# Patient Record
Sex: MALE | Race: WHITE
[De-identification: names, ages, dates, MRNs, and addresses within clinical notes are randomized per-mention and may not be internally consistent; named-entity substitution may affect disease eponyms.]

---

## 2018-07-23 ENCOUNTER — HOSPITAL ENCOUNTER (EMERGENCY)
Age: 33
Discharge: PSYCHIATRIC OR SUBSTANCE USE FACILITY | End: 2018-07-23

## 2018-07-23 ENCOUNTER — HOSPITAL ENCOUNTER (EMERGENCY)
Dept: HOSPITAL 91 - E/R | Age: 33
LOS: 3 days | Discharge: PSYCHIATRIC OR SUBSTANCE USE FACILITY | End: 2018-07-26
Payer: COMMERCIAL

## 2018-07-23 DIAGNOSIS — E11.9: ICD-10-CM

## 2018-07-23 DIAGNOSIS — R44.0: ICD-10-CM

## 2018-07-23 DIAGNOSIS — F79: Primary | ICD-10-CM

## 2018-07-23 DIAGNOSIS — F17.210: ICD-10-CM

## 2018-07-23 LAB
ACETAMINOPHEN: < 10 UG/ML (ref 10–30)
ADD MAN DIFF?: NO
ADD UMIC: YES
ALANINE AMINOTRANSFERASE: 33 IU/L (ref 13–69)
ALBUMIN/GLOBULIN RATIO: 1.05
ALBUMIN: 3.8 G/DL (ref 3.3–4.9)
ALKALINE PHOSPHATASE: 98 IU/L (ref 42–121)
ANION GAP: 14 (ref 8–16)
ASPARTATE AMINO TRANSFERASE: 41 IU/L (ref 15–46)
BASOPHIL #: 0 10^3/UL (ref 0–0.1)
BASOPHILS %: 0.2 % (ref 0–2)
BILIRUBIN,DIRECT: 0 MG/DL (ref 0–0.2)
BILIRUBIN,TOTAL: 0.1 MG/DL (ref 0.2–1.3)
BLOOD UREA NITROGEN: 11 MG/DL (ref 7–20)
CALCIUM: 8.8 MG/DL (ref 8.4–10.2)
CARBON DIOXIDE: 22 MMOL/L (ref 21–31)
CHLORIDE: 108 MMOL/L (ref 97–110)
CREATININE: 0.61 MG/DL (ref 0.61–1.24)
EOSINOPHILS #: 0.2 10^3/UL (ref 0–0.5)
EOSINOPHILS %: 1.3 % (ref 0–7)
ETHANOL: < 10 MG/DL
GLOBULIN: 3.6 G/DL (ref 1.3–3.2)
GLUCOSE: 154 MG/DL (ref 70–220)
HEMATOCRIT: 40.3 % (ref 42–52)
HEMOGLOBIN: 12.8 G/DL (ref 14–18)
LYMPHOCYTES #: 3 10^3/UL (ref 0.8–2.9)
LYMPHOCYTES %: 23.8 % (ref 15–51)
MEAN CORPUSCULAR HEMOGLOBIN: 27.3 PG (ref 29–33)
MEAN CORPUSCULAR HGB CONC: 31.8 G/DL (ref 32–37)
MEAN CORPUSCULAR VOLUME: 85.9 FL (ref 82–101)
MEAN PLATELET VOLUME: 11.3 FL (ref 7.4–10.4)
MONOCYTE #: 0.6 10^3/UL (ref 0.3–0.9)
MONOCYTES %: 4.4 % (ref 0–11)
NEUTROPHIL #: 8.7 10^3/UL (ref 1.6–7.5)
NEUTROPHILS %: 69.4 % (ref 39–77)
NUCLEATED RED BLOOD CELLS #: 0 10^3/UL (ref 0–0)
NUCLEATED RED BLOOD CELLS%: 0 /100WBC (ref 0–0)
PLATELET COUNT: 256 10^3/UL (ref 140–415)
POTASSIUM: 3.7 MMOL/L (ref 3.5–5.1)
RED BLOOD COUNT: 4.69 10^6/UL (ref 4.7–6.1)
RED CELL DISTRIBUTION WIDTH: 15.3 % (ref 11.5–14.5)
SALICYLATE: < 1 MG/DL (ref 5–30)
SODIUM: 140 MMOL/L (ref 135–144)
TOTAL PROTEIN: 7.4 G/DL (ref 6.1–8.1)
UR ASCORBIC ACID: NEGATIVE MG/DL
UR BILIRUBIN (DIP): NEGATIVE MG/DL
UR BLOOD (DIP): NEGATIVE MG/DL
UR CLARITY: CLEAR
UR COLOR: YELLOW
UR GLUCOSE (DIP): NEGATIVE MG/DL
UR KETONES (DIP): NEGATIVE MG/DL
UR LEUKOCYTE ESTERASE (DIP): NEGATIVE LEU/UL
UR MUCUS: (no result) /HPF
UR NITRITE (DIP): NEGATIVE MG/DL
UR PH (DIP): 5 (ref 5–9)
UR RBC: 4 /HPF (ref 0–5)
UR SPECIFIC GRAVITY (DIP): 1.03 (ref 1–1.03)
UR TOTAL PROTEIN (DIP): (no result) MG/DL
UR UROBILINOGEN (DIP): (no result) MG/DL
UR WBC: 1 /HPF (ref 0–5)
WHITE BLOOD COUNT: 12.5 10^3/UL (ref 4.8–10.8)

## 2018-07-23 PROCEDURE — 80053 COMPREHEN METABOLIC PANEL: CPT

## 2018-07-23 PROCEDURE — 85025 COMPLETE CBC W/AUTO DIFF WBC: CPT

## 2018-07-23 PROCEDURE — 81001 URINALYSIS AUTO W/SCOPE: CPT

## 2018-07-23 PROCEDURE — 80307 DRUG TEST PRSMV CHEM ANLYZR: CPT

## 2018-07-23 PROCEDURE — 99285 EMERGENCY DEPT VISIT HI MDM: CPT

## 2018-07-23 RX ADMIN — OLANZAPINE 1 MG: 5 TABLET, FILM COATED ORAL at 23:30

## 2018-07-24 RX ADMIN — OLANZAPINE 1 MG: 5 TABLET, FILM COATED ORAL at 08:20

## 2018-07-25 RX ADMIN — OLANZAPINE 1 MG: 5 TABLET, ORALLY DISINTEGRATING ORAL at 10:00

## 2018-07-25 RX ADMIN — OLANZAPINE 1 MG: 5 TABLET, FILM COATED ORAL at 08:40

## 2018-07-25 RX ADMIN — OLANZAPINE 1 MG: 5 TABLET, FILM COATED ORAL at 21:00

## 2018-07-25 RX ADMIN — DIVALPROEX SODIUM 1 MG: 500 TABLET, DELAYED RELEASE ORAL at 10:00

## 2018-07-25 RX ADMIN — DIVALPROEX SODIUM 1 MG: 500 TABLET, DELAYED RELEASE ORAL at 21:00

## 2018-07-25 RX ADMIN — OLANZAPINE 1 MG: 5 TABLET, ORALLY DISINTEGRATING ORAL at 21:06

## 2018-07-26 RX ADMIN — OLANZAPINE 1 MG: 5 TABLET, FILM COATED ORAL at 21:00

## 2018-07-26 RX ADMIN — DIVALPROEX SODIUM 1 MG: 500 TABLET, DELAYED RELEASE ORAL at 21:28

## 2018-07-26 RX ADMIN — HALOPERIDOL LACTATE 1 MG: 5 INJECTION, SOLUTION INTRAMUSCULAR at 21:23

## 2018-07-26 RX ADMIN — LORAZEPAM 1 MG: 2 INJECTION, SOLUTION INTRAMUSCULAR; INTRAVENOUS at 21:22

## 2018-07-26 RX ADMIN — LITHIUM CARBONATE 1 MG: 300 CAPSULE, GELATIN COATED ORAL at 21:00

## 2019-04-06 ENCOUNTER — HOSPITAL ENCOUNTER (EMERGENCY)
Dept: HOSPITAL 10 - E/R | Age: 34
LOS: 1 days | Discharge: HOME | End: 2019-04-07
Payer: COMMERCIAL

## 2019-04-06 ENCOUNTER — HOSPITAL ENCOUNTER (EMERGENCY)
Dept: HOSPITAL 91 - E/R | Age: 34
LOS: 1 days | Discharge: HOME | End: 2019-04-07
Payer: COMMERCIAL

## 2019-04-06 VITALS
WEIGHT: 315 LBS | HEIGHT: 64 IN | HEIGHT: 64 IN | BODY MASS INDEX: 53.78 KG/M2 | BODY MASS INDEX: 53.78 KG/M2 | WEIGHT: 315 LBS

## 2019-04-06 DIAGNOSIS — F17.210: ICD-10-CM

## 2019-04-06 DIAGNOSIS — R00.0: ICD-10-CM

## 2019-04-06 DIAGNOSIS — D72.829: ICD-10-CM

## 2019-04-06 DIAGNOSIS — F19.10: ICD-10-CM

## 2019-04-06 DIAGNOSIS — E11.9: ICD-10-CM

## 2019-04-06 DIAGNOSIS — I10: ICD-10-CM

## 2019-04-06 DIAGNOSIS — F15.10: ICD-10-CM

## 2019-04-06 DIAGNOSIS — R07.9: Primary | ICD-10-CM

## 2019-04-06 DIAGNOSIS — E66.01: ICD-10-CM

## 2019-04-06 LAB
ADD MAN DIFF?: NO
ANION GAP: 14 (ref 5–13)
BASOPHIL #: 0.1 10^3/UL (ref 0–0.1)
BASOPHILS %: 0.4 % (ref 0–2)
BLOOD UREA NITROGEN: 8 MG/DL (ref 7–20)
CALCIUM: 9.9 MG/DL (ref 8.4–10.2)
CARBON DIOXIDE: 24 MMOL/L (ref 21–31)
CHLORIDE: 103 MMOL/L (ref 97–110)
CREATININE: 0.58 MG/DL (ref 0.61–1.24)
EOSINOPHILS #: 0.4 10^3/UL (ref 0–0.5)
EOSINOPHILS %: 3.3 % (ref 0–7)
GLUCOSE: 123 MG/DL (ref 70–220)
HEMATOCRIT: 44.8 % (ref 42–52)
HEMOGLOBIN: 14.4 G/DL (ref 14–18)
IMMATURE GRANS #M: 0.09 10^3/UL (ref 0–0.03)
IMMATURE GRANS % (M): 0.7 % (ref 0–0.43)
LYMPHOCYTES #: 4.3 10^3/UL (ref 0.8–2.9)
LYMPHOCYTES %: 32.1 % (ref 15–51)
MEAN CORPUSCULAR HEMOGLOBIN: 28 PG (ref 29–33)
MEAN CORPUSCULAR HGB CONC: 32.1 G/DL (ref 32–37)
MEAN CORPUSCULAR VOLUME: 87 FL (ref 82–101)
MEAN PLATELET VOLUME: 11.7 FL (ref 7.4–10.4)
MONOCYTE #: 0.9 10^3/UL (ref 0.3–0.9)
MONOCYTES %: 6.8 % (ref 0–11)
NEUTROPHIL #: 7.5 10^3/UL (ref 1.6–7.5)
NEUTROPHILS %: 56.7 % (ref 39–77)
NUCLEATED RED BLOOD CELLS #: 0 10^3/UL (ref 0–0)
NUCLEATED RED BLOOD CELLS%: 0 /100WBC (ref 0–0)
PLATELET COUNT: 313 10^3/UL (ref 140–415)
POTASSIUM: 4.1 MMOL/L (ref 3.5–5.1)
RED BLOOD COUNT: 5.15 10^6/UL (ref 4.7–6.1)
RED CELL DISTRIBUTION WIDTH: 15.2 % (ref 11.5–14.5)
SODIUM: 141 MMOL/L (ref 135–144)
WHITE BLOOD COUNT: 13.3 10^3/UL (ref 4.8–10.8)

## 2019-04-06 PROCEDURE — 36415 COLL VENOUS BLD VENIPUNCTURE: CPT

## 2019-04-06 PROCEDURE — 71045 X-RAY EXAM CHEST 1 VIEW: CPT

## 2019-04-06 PROCEDURE — 85025 COMPLETE CBC W/AUTO DIFF WBC: CPT

## 2019-04-06 PROCEDURE — 99285 EMERGENCY DEPT VISIT HI MDM: CPT

## 2019-04-06 PROCEDURE — 85610 PROTHROMBIN TIME: CPT

## 2019-04-06 PROCEDURE — 80048 BASIC METABOLIC PNL TOTAL CA: CPT

## 2019-04-06 PROCEDURE — 80307 DRUG TEST PRSMV CHEM ANLYZR: CPT

## 2019-04-06 PROCEDURE — 84484 ASSAY OF TROPONIN QUANT: CPT

## 2019-04-06 RX ADMIN — ASPIRIN 81 MG CHEWABLE TABLET 1 MG: 81 TABLET CHEWABLE at 23:16

## 2019-04-07 VITALS — DIASTOLIC BLOOD PRESSURE: 95 MMHG | SYSTOLIC BLOOD PRESSURE: 159 MMHG | HEART RATE: 87 BPM | RESPIRATION RATE: 18 BRPM

## 2019-04-07 LAB
INR: 0.87
PROTIME: 11.9 SEC (ref 11.9–14.9)
PT RATIO: 0.9
TROPONIN-I: < 0.012 NG/ML (ref 0–0.12)

## 2019-04-07 RX ADMIN — LORAZEPAM 1 MG: 2 INJECTION, SOLUTION INTRAMUSCULAR; INTRAVENOUS at 00:00

## 2022-06-14 NOTE — ERD
ER Documentation


Chief Complaint


Chief Complaint





chest pain/sob/left arm pain  x 1 day





HPI


This is a 33-year-old male with a reported past medical history of hypertension,


hyperlipidemia, diabetes, morbid obesity, previous psychiatric history, 


polysubstance abuse including methamphetamines who is presenting with 3-4 hours 


of sudden onset moderate pressure-like chest pain radiating into his left 


shoulder.  The patient is diaphoretic and anxious and endorses palpitations.  He


is tachycardic in the emergency department.  The patient denies using 


methamphetamine recently, but on previous medical records, the patient has used 


this drug in the past.  The patient denies lightheadedness or dizziness.  He 


denies shortness of breath.  He denies any exacerbating or alleviating factors. 


He was sitting and at rest when the symptoms started.





The patient denies feeling sick recently.  The patient denies fever or chills.  


The patient has had no headache or vision changes.  The patient does not endorse


neck or back pain.  The patient denies nausea or vomiting. The patient denies 


abdominal pain. The patient denies changes to bowel movements or urination.  The


patient has had no focal deficits.  The patient has had no weakness or numbness 


or tingling to the face or extremities.





ROS


All systems reviewed and are negative except as per history of present illness.





Medications


Home Meds


No Active Prescriptions or Reported Meds





Allergies


Allergies:  


Coded Allergies:  


     No Known Allergy (Unverified , 7/23/18)





PMhx/Soc


History of Surgery:  Yes (L knee surgery)


Anesthesia Reaction:  No


Hx Neurological Disorder:  No


Hx Respiratory Disorders:  No


Hx Cardiac Disorders:  Yes (Hypertension, hyperlipidemia, diabetes, morbid o


besity)


Hx Psychiatric Problems:  Yes (schizophrenia, bipolar)


Hx Miscellaneous Medical Probl:  No


Hx Alcohol Use:  Yes


Hx Substance Use:  Yes (meth, marijuana)


Hx Tobacco Use:  Yes


Smoking Status:  Current every day smoker





FmHx


Family History:  diabetes





Physical Exam


Vitals





Vital Signs


  Date      Temp  Pulse  Resp  B/P (MAP)   Pulse Ox  O2          O2 Flow    FiO2


Time                                                 Delivery    Rate


    4/7/19           90    18      102/62        98  Room Air


     01:57                           (75)


    4/6/19                                           Nasal               2


     23:12                                           Cannula


    4/6/19  98.2    125    20      151/77        98


     21:41                          (101)





Physical Exam


Const:   No apparent distress, well-developed, well-nourished.


Head:   Normocephalic, Atraumatic 


Eyes:   Normal Conjunctiva.  Extraocular movements intact. Pupils equal, round 


and reactive to light


ENT:   Normal External Ears, Nose and Mouth.


Neck:   Full range of motion. No meningismus.


Resp:   Clear to auscultation bilaterally, No wheezes, rales or rhonchi


Cardio:   Regular rhythm.  Tachycardia.  No murmurs, rubs or gallops


Abd:   Morbid obesity.  Soft, non tender, non distended. Normal bowel sounds


Skin:   No petechiae or rashes.  Diaphoretic.


Back:   No midline tenderness. No CVA tenderness


Ext:   No cyanosis, or edema


Neur:   Awake and alert, oriented 4.  Cranial nerves intact.  No facial droop. 


Normal strength, sensation and coordination.


Psych:   Anxious.


Result Diagram:  


4/6/19 2310                                                                     


          4/6/19 2310





Results 24 hrs





Laboratory Tests


              Test
                                  4/6/19
23:10


              White Blood Count                     13.3 10^3/ul


              Red Blood Count                       5.15 10^6/ul


              Hemoglobin                               14.4 g/dl


              Hematocrit                                  44.8 %


              Mean Corpuscular Volume                    87.0 fl


              Mean Corpuscular Hemoglobin                28.0 pg


              Mean Corpuscular Hemoglobin
Concent     32.1 g/dl 



              Red Cell Distribution Width                 15.2 %


              Platelet Count                         313 10^3/UL


              Mean Platelet Volume                       11.7 fl


              Immature Granulocytes %                    0.700 %


              Neutrophils %                               56.7 %


              Lymphocytes %                               32.1 %


              Monocytes %                                  6.8 %


              Eosinophils %                                3.3 %


              Basophils %                                  0.4 %


              Nucleated Red Blood Cells %            0.0 /100WBC


              Immature Granulocytes #              0.090 10^3/ul


              Neutrophils #                          7.5 10^3/ul


              Lymphocytes #                          4.3 10^3/ul


              Monocytes #                            0.9 10^3/ul


              Eosinophils #                          0.4 10^3/ul


              Basophils #                            0.1 10^3/ul


              Nucleated Red Blood Cells #            0.0 10^3/ul


              Prothrombin Time                          11.9 Sec


              Prothrombin Time Ratio                         0.9


              INR International Normalized
Ratio           0.87 



              Sodium Level                            141 mmol/L


              Potassium Level                         4.1 mmol/L


              Chloride Level                          103 mmol/L


              Carbon Dioxide Level                     24 mmol/L


              Anion Gap                                       14


              Blood Urea Nitrogen                        8 mg/dl


              Creatinine                              0.58 mg/dl


              Est Glomerular Filtrat Rate
mL/min   > 60 mL/min 



              Glucose Level                            123 mg/dl


              Calcium Level                            9.9 mg/dl


              Troponin I                           < 0.012 ng/ml


              Urine Opiates Screen                 Negative


              Urine Barbiturates                   Negative


              Urine Amphetamines Screen            Negative


              Urine Benzodiazepines Screen         Negative


              Urine Cocaine Screen                 Negative


              Urine Cannabinoids                   Negative





Current Medications


 Medications
   Dose
          Sig/Balbir
       Start Time
   Status  Last


 (Trade)       Ordered        Route
 PRN     Stop Time              Admin
Dose


                              Reason                                Admin


 Aspirin
       324 mg         ONCE  ONCE
    4/6/19        DC            4/6/19


(Aspirin)                     PO
            23:30
 4/6/19                23:16



                                             23:31


 Lorazepam
     1 mg           ONCE  ONCE
    4/7/19        DC       



(Ativan)                      IM
            00:00
 4/7/19


                                             00:01








Procedures/MDM


MDM





The patient's presentation warrants further investigation. Previous medical 


records, if available, were reviewed.





LABS





The patient's laboratory testing was obtained and reviewed. No emergent 


treatment was required unless described below.





CBC:   Mild leukocytosis without shift, likely reactive, less likely related to 


a systemic infection.  No E/o anemia or thrombocytopenia


Chemistry:   No E/o severe acidosis or alkalosis or renal failure or diabetic 


ketoacidosis


PT/INR:   No E/o significant coagulopathy


Troponin:   No E/o acute ischemia


Tox:   No E/o illicit drug use





EKG





EKG read by me: 


Rate/Rhythm:    Sinus tachycardia at 126 bpm


Intervals:    Normal


Axis:    Rightward deviation


Impression:    No evidence of acute ischemia.  Sinus tachycardia.





IMAGING





Imaging and Radiology interpretation reviewed.





CXR


FINDINGS:


Mediastinum: Unremarkable.


Heart size:  Normal.


Pulmonary vasculature:  No visible engorgement.


Lungs:  Clear.


Costophrenic sulci:  Clear.


Bony structures:  Grossly unremarkable for age.


IMPRESSION: Unremarkable single view chest.


Electronically viewed and signed by Physician Laura on 04/07/2019 00:40 





TREATMENT/DISPOSITION





The patient presents for chest pain.  A cardiac workup was completed.  The 


patient's UDS was negative, but the patient has a history of polysubstance 


abuse, and I am concerned about the possibility of a sympathomimetic.  The 


patient was given aspirin in the emergency department initially with no 


significant change in the patient's overall symptoms.  The patient was later 


given a dose of Ativan which did help to improve his symptoms.  The patient was 


initially tachycardic, but this resolved after being given Ativan.  





The patient's chest xray does not reveal pneumonia or pneumothorax or pleural 


effusions or pulmonary edema. The patient does not have a widened mediastinum 


and does not have signs or symptoms concerning for thoracic aortic aneurysm or 


dissection. The patient does not have pneumomediastinum or signs concerning for 


esophageal tear or rupture. The patient has no clinical or radiographic signs of


pericardial effusion or tamponade.  The patient does not have pneumoperitoneum 


and I have decreased suspicion of viscus perforation as possible referred pain.





The patient does not have a history of heart failure and I have low suspicion 


for this. The patient does not have a diagnosis of COPD and is not wheezing 


today. The patient is not tachypneic or hypoxic. The patient is breathing 


comfortably and without pleuritic pain. The patient is not on hormonal therapy. 


The patient has no history of clotting or bleeding disorders. The patient has no


calf tenderness. The patient has had no hemoptysis.  I have decreased suspicion 


for PE. The patient's troponin is negative.  The patient's EKG reveals 


tachycardia but no evidence of cardiac ischemia. I have low suspicion for acute 


coronary syndrome. 





The patient's HEART score is equal to or less than 3. This stratifies the 


patient into the low risk (<1%) group for an major adverse cardiac event within 


the next 30 days. Shared decision making was enacted. The risks and benefits of 


admission and discharge were discussed with the patient and it was ultimately 


decided that the patient would be discharged with close outpatient follow up and


evaluation for functional testing within 72 hours.





DISCHARGE





Upon reevaluation of the patient, symptoms have improved. No emergent diagnoses 


were identified. At this time, I feel that the patient stable for discharge.  


The patient was instructed to follow-up with a primary care physician in 1-3 


days.  The patient will be given strict precautions with which to return to the 


emergency department.





Prescriptions: None





The patient's blood pressure was elevated at greater than 120/80 while in the 


emergency department.  The patient was otherwise stable with no evidence of 


hypertensive urgency or emergency.  The patient does not require admission for 


blood pressure control. I have discussed with the patient the risks of 


hypertension. I have instructed the patient to return to the ER for any new or 


worsening symptoms including chest pain, shortness of breath, headache, blurred 


vision, confusion, nausea, vomiting or LOC. I have advised the patient to follow


up with the primary care physician for outpatient monitoring and treatment for 


hypertension in 1-3 days.





Disclaimer: Inadvertent spelling and grammatical errors are likely due to EHR/


dictation software use and do not reflect on the overall quality of patient 


care. Note that the electronic time recorded on this note does not necessarily 


reflect the actual time of the patient encounter.





Departure


Diagnosis:  


   Primary Impression:  


   Chest pain


   Chest pain type:  unspecified  Qualified Codes:  R07.9 - Chest pain, 


   unspecified


   Additional Impressions:  


   Tachycardia


   Leukocytosis


   Leukocytosis type:  unspecified  Qualified Codes:  D72.829 - Elevated white 


   blood cell count, unspecified


   History of substance abuse


Condition:  Stable


Patient Instructions:  Chest Pain, Uncertain Cause, Sinus Tachycardia





Additional Instructions:  


Thank you for for coming to [facility] for your care today. 


Please ask your nurse or provider if you have questions about your care today 


and do not leave until all your questions have been answered.  Please use any 


medications given as directed and follow-up with your doctor (or the doctor you 


were referred to) in the next 1-3 days. If you do not have a primary care doctor


you may follow up at the [facility] or [facility] (listed below). You


may also use motrin and tylenol as needed for fever and/or pain unless 


instructed otherwise by your provider or nurse. Indications for more urgent foll


ow-up have been discussed, but you may return to the Emergency Department at ANY


time for any worrisome or worsening symptoms.





If you have abdominal pain, please know that no test or exam you received is 


perfect and you should follow up within 8 hours for continued pain.





If you had any imaging studies today, such as an X-Ray or CT Scan, these studies


will be reviewed later by a radiologist. You will be called if there are 


important findings that were not identified today, so make sure the contact 


information you provided at registration is correct.





If you received any narcotic pain control medicine today, such as Vicodin, 


Morphine or Dilaudid, your coordination and judgment may be affected for a 


number of hours. Please do not drive or operate heavy machinery, and you may 


want someone to assist you at home. If you were given a prescription for 


narcotic medication, be aware that it is very addictive- use sparingly and only 


if necessary.





PLEASE SEEK FURTHER EVALUATION AND MANAGEMENT AT YOUR DOCTORS OFFICE WITHIN THE 


NEXT 1-3 DAYS. IT IS YOUR RESPONSIBILITY TO MAKE AN APPOINTMENT FOR FOLOW-UP 


CARE.





IF YOU HAVE A PRIMARY DOCTOR, PLEASE CALL THEIR OFFICE TO SCHEDULE AN 


APPOINTMENT FOR FOLLOW UP.





IF YOU DO NOT HAVE A PRIMARY DOCTOR YOU CAN CALL OUR PHYSICIAN REFERRAL HOTLINE 


AT [phone] 





IF YOU CAN NOT AFFORD TO SEE A PHYSICIAN YOU CAN CHOSE FROM THE FOLLOWING 


[facility] CLINICS:





[facility] [phone] [address] [facility]





[facility] [phone] [address] [facility] [phone] [facility] [phone] [facility] [phone] [facility]. [facility]. [phone] [address] SUKH HAHN MD               Apr 6, 2019 23:43 Carac Counseling:  I discussed with the patient the risks of Carac including but not limited to erythema, scaling, itching, weeping, crusting, and pain.